# Patient Record
Sex: MALE | Race: WHITE | Employment: OTHER | URBAN - METROPOLITAN AREA
[De-identification: names, ages, dates, MRNs, and addresses within clinical notes are randomized per-mention and may not be internally consistent; named-entity substitution may affect disease eponyms.]

---

## 2022-06-07 ENCOUNTER — OFFICE VISIT (OUTPATIENT)
Dept: NEUROLOGY | Age: 72
End: 2022-06-07
Payer: MEDICARE

## 2022-06-07 VITALS
WEIGHT: 190 LBS | BODY MASS INDEX: 26.5 KG/M2 | DIASTOLIC BLOOD PRESSURE: 98 MMHG | SYSTOLIC BLOOD PRESSURE: 156 MMHG | HEART RATE: 65 BPM

## 2022-06-07 DIAGNOSIS — R26.9 GAIT DISORDER: ICD-10-CM

## 2022-06-07 DIAGNOSIS — G62.9 SENSORY NEUROPATHY: ICD-10-CM

## 2022-06-07 DIAGNOSIS — R20.0 NUMBNESS AND TINGLING OF BOTH FEET: Primary | ICD-10-CM

## 2022-06-07 DIAGNOSIS — R29.898 WEAKNESS OF BOTH LEGS: ICD-10-CM

## 2022-06-07 DIAGNOSIS — R20.2 NUMBNESS AND TINGLING OF BOTH FEET: Primary | ICD-10-CM

## 2022-06-07 PROCEDURE — 95910 NRV CNDJ TEST 7-8 STUDIES: CPT | Performed by: PSYCHIATRY & NEUROLOGY

## 2022-06-07 PROCEDURE — 95885 MUSC TST DONE W/NERV TST LIM: CPT | Performed by: PSYCHIATRY & NEUROLOGY

## 2022-06-07 NOTE — PATIENT INSTRUCTIONS
Patient Education        Learning About Peripheral Neuropathy  What is peripheral neuropathy? Peripheral neuropathy is a problem that affects the peripheral nerves. These nerves lead from the spinal cord to other parts of the body. They control yoursense of touch, how you feel pain and temperature, and your muscle strength. Most of the time the problem starts in the fingers and toes. As it gets worse, it moves into the limbs. It can cause pain and loss of feeling in the feet,legs, and hands. What causes it? There are several causes:   Diabetes. This is the most common cause. If your blood sugar is too high for too long, it can damage the nerves.  Kidney problems. These can lead to toxic substances in the blood that damage nerves.  Low levels of thyroid hormone (hypothyroidism). This may cause swelling of the tissues around the nerves, which can put pressure on them.  Infectious or inflammatory diseases. Examples are HIV and Guillain-Barré syndrome. These diseases can damage the nerves.  Peripheral nerve injury. A physical injury can damage the nerves. Injuries can be from things like falls, car crashes, or playing sports.  Overusing alcohol and not eating a healthy diet. These can lead to your body not having enough of certain vitamins, such as vitamin B-12. This can damage nerves.  Being exposed to toxic substances. These include lead, mercury, and certain medicines, such as those used for chemotherapy. Sometimes the cause isn't known. What are the symptoms? Symptoms of peripheral neuropathy can occur slowly over time. The most commonones are:   Numbness, tightness, and tingling, especially in the legs, hands, and feet.  Loss of feeling.  Burning, shooting, or stabbing pain in the legs, hands, and feet. Often the pain is worse at night.  Weakness and loss of balance. What can happen if you have it?   If peripheral neuropathy gets worse, it can lead to a complete lack of feeling in your hands or feet. This can make you more likely to injure them. It may lead to calluses and blisters. It can also lead to bone and joint problems,infection, and ulcers. For instance, small, repeated injuries to the foot may lead to bigger problems. This can happen because you can't feel the injuries. Reduced feeling in thefeet can also change your step, leading to bone or joint problems. If untreated, foot problems can become so severe that the foot or lower leg may have to be amputated. But treatment can slow down peripheral neuropathy. Andit's a good idea to take care to avoid injury. How is it diagnosed? To diagnose peripheral neuropathy, your doctor will ask you about:   Your symptoms.  Your medical history. This may include your use of alcohol, risk of HIV infection, or exposure to toxic substances.  Your family's medical history, including nerve disease. Your doctor may test how well you can feel touch, temperature, and pain. You may also have blood tests. These tests will help the doctor find out if you have conditions that can cause neuropathy. Examples are diabetes, vitamindeficiencies, thyroid disease, and kidney problems. How is it treated? Treatment for peripheral neuropathy can relieve symptoms. This is done by treating the health problem that's causing it. For example, if you have diabetes, keeping your blood sugar within your target range may help. Or maybe your body lacks certain vitamins caused by drinking too much alcohol. In that case, treatment may include eating a healthy diet, taking vitamins, andstopping alcohol use. You may have physical therapy. This can increase muscle strength and help build muscle control. Over-the-counter medicine can relieve mild nerve pain. Your doctor may also prescribe medicine to help with severe pain, numbness, tingling, and weakness. If you have neuropathy in your feet, it's a good ideato have them checked during each office visit.  This can help prevent problems. Some people find that physical therapy, acupuncture, or transcutaneouselectrical nerve stimulation (TENS) helps relieve pain. Follow-up care is a key part of your treatment and safety. Be sure to make and go to all appointments, and call your doctor if you are having problems. It's also a good idea to know your test results and keep alist of the medicines you take. Where can you learn more? Go to https://chpepiceweb.Altar. org and sign in to your [employer] account. Enter P130 in the [employer] box to learn more about \"Learning About Peripheral Neuropathy. \"     If you do not have an account, please click on the \"Sign Up Now\" link. Current as of: July 28, 2021               Content Version: 13.2  © 2006-2022 [employer]. Care instructions adapted under license by [facility] ([facility]). If you have questions about a medical condition or this instruction, always ask your healthcare professional. [place] [address] any warranty or liability for your use of this information. Patient Education        Preventing Falls: Care Instructions  Your Care Instructions     Getting around your home safely can be a challenge if you have injuries or health problems that make it easy for you to fall. Loose rugs and furniture in walkways are among the dangers for many older people who have problems walking or who have poor eyesight. People who have conditions such as arthritis,osteoporosis, or dementia also have to be careful not to fall. You can make your home safer with a few simple measures. Follow-up care is a key part of your treatment and safety. Be sure to make and go to all appointments, and call your doctor if you are having problems. It's also a good idea to know your test results and keep alist of the medicines you take. How can you care for yourself at home?   Taking care of yourself   Exercise regularly to improve your strength, muscle tone, and balance. Walk if you can. Swimming may be a good choice if you cannot walk easily.  Have your vision and hearing checked each year or any time you notice a change. If you have trouble seeing and hearing, you might not be able to avoid objects and could lose your balance.  Know the side effects of the medicines you take. Ask your doctor or pharmacist whether the medicines you take can affect your balance. Sleeping pills or sedatives can affect your balance.  Limit the amount of alcohol you drink. Alcohol can impair your balance and other senses.  Ask your doctor whether calluses or corns on your feet need to be removed. If you wear loose-fitting shoes because of calluses or corns, you can lose your balance and fall.  Talk to your doctor if you have numbness in your feet.  You may get dizzy if you do not drink enough water. To prevent dehydration, drink plenty of fluids. Choose water and other clear liquids. If you have kidney, heart, or liver disease and have to limit fluids, talk with your doctor before you increase the amount of fluids you drink. Preventing falls at home   Remove raised doorway thresholds, throw rugs, and clutter. Repair loose carpet or raised areas in the floor. [facility] furniture and electrical cords to keep them out of walking paths.  Use nonskid floor wax, and wipe up spills right away, especially on ceramic tile floors.  If you use a walker or cane, put rubber tips on it. If you use crutches, clean the bottoms of them regularly with an abrasive pad, such as steel wool.  Keep your house well lit, especially [employer], and outside walkways. Use night-lights in areas such as hallways and bathrooms. Add extra light switches or use remote switches (such as switches that go on or off when you clap your hands) to make it easier to turn lights on if you have to get up during the night.  Install sturdy handrails on stairways.    Move items in your cabinets so that the things you use a lot are on the lower shelves (about waist level).  Keep a cordless phone and a flashlight with new batteries by your bed. If possible, put a phone in each of the main rooms of your house, or carry a cell phone in case you fall and cannot reach a phone. Or, you can wear a device around your neck or wrist. You push a button that sends a signal for help.  Wear low-heeled shoes that fit well and give your feet good support. Use footwear with nonskid soles. Check the heels and soles of your shoes for wear. Repair or replace worn heels or soles.  Do not wear socks without shoes on wood floors.  Walk on the grass when the sidewalks are slippery. If you live in an area that gets snow and ice in the winter, sprinkle salt on slippery steps and sidewalks. Or ask a family member or friend to do this for you. Preventing falls in the bath   Install grab bars and nonskid mats inside and outside your shower or tub and near the toilet and sinks.  Use shower chairs and bath benches.  Use a hand-held shower head that will allow you to sit while showering.  Get into a tub or shower by putting the weaker leg in first. Get out of a tub or shower with your strong side first.   Repair loose toilet seats and consider installing a raised toilet seat to make getting on and off the toilet easier.  Keep your bathroom door unlocked while you are in the shower. Where can you learn more? Go to https://True Fitngozi.Checkmarx. org and sign in to your [employer] account. Enter 0476 [id number] [phone] in the [facility] box to learn more about \"Preventing Falls: Care Instructions. \"     If you do not have an account, please click on the \"Sign Up Now\" link. Current as of: September 8, 2021               Content Version: 13.2  © 7290-7735 Healthwise, Incorporated. Care instructions adapted under license by [facility] ([facility]).  If you have questions about a medical condition or this instruction, always ask your healthcare professional. [address] any warranty or liability for your use of this information. Patient Education        Preventing Outdoor Falls: Care Instructions  Your Care Instructions     Worries about falls don't need to keep you indoors. Outdoor activities like walking have big benefits for your health. You will need to watch your step andlearn a few safety measures. If you are worried about having a fall outdoors, ask your doctor about exercises, classes, or physical therapy that may help. You can learn ways to gain strength, flexibility, and balance. Ask if it might help to use a cane orwalker. You can make your time outdoors safer with a few simple measures. Follow-up care is a key part of your treatment and safety. Be sure to make and go to all appointments, and call your doctor if you are having problems. It's also a good idea to know your test results and keep alist of the medicines you take. How can you prevent falls outdoors?  Wear shoes with firm soles and low heels. If you have to walk on an icy surface, use grippers that can be worn over your shoes in bad weather.  Be extra careful if weather is bad. Walk on the grass when the sidewalks are slick. If you live in a place that gets snow and ice in the winter, sprinkle salt on slippery stairs and sidewalks.  Be careful getting on or off buses and trains or getting in and out of cars. If handrails are available, use them.  Be careful when you cross the street. Look for crosswalks or places where curb cuts or ramps are present.  Try not to hurry, especially if you are carrying something.  Be cautious in parking lots or garages. There may be curbs or changes in pavement, or the height of the pavement may vary.  Make sure to wear the correct eyeglasses, if you need them. Reading glasses or bifocals can make it harder to see hazards that might be in your way.    If you are walking outdoors for exercise, try to:  ? Walk in well-lighted, well-maintained areas. These include high school or college tracks, shopping malls, and public spaces. ? Walk with a partner. ? Watch out for cracked sidewalks, curbs, changes in the height of the pavement, exposed tree roots, and debris such as fallen leaves or branches. Where can you learn more? Go to https://Pindrop Securitypepiceweb.healthSgnam. org and sign in to your [employer] account. Enter U071 in the [facility] box to learn more about \"Preventing Outdoor Falls: Care Instructions. \"     If you do not have an account, please click on the \"Sign Up Now\" link. Current as of: September 8, 2021               Content Version: 13.2  © 2006-2022 Healthwise, Incorporated. Care instructions adapted under license by [facility] ([facility]). If you have questions about a medical condition or this instruction, always ask your healthcare professional. [place] [address] any warranty or liability for your use of this information.

## 2022-06-07 NOTE — PROGRESS NOTES
EMG/Nerve Conduction Study Procedure Note  [facility] [place]   [phone]      Hx:    Exam:     66 y.o. male returning today for EMG/NCV of the lower extremities for numbness and tingling bottom of feet and toes for the past year as well as weakness for the past couple of months, wife states weakness has improved. Diagnosed with Parkinson's disease 2019. No hx of DM. Dec DTR distal and PP and Vib loss stockings dist bilat symmetric. Summary               G of selected muscles of the lower extremities as noted below. 1.      Controlled environmental factors / EMG lab. Temperature. 2. NCV : sensory segments:    Abnormal = there is borderline SCV bilateral sural SCV but significant amplitude reduction SNAP. 3. NCV plantar sensory segments: Deferred. 4. NCV Motor MCV segments:     Normal bilateral peroneal bilateral tibial MCV CMAP. No definitive motor phenomena = MCV.  5. F-wave studies:   Abnormal = prolonged bilateral peroneal bilateral tibial F waves. 6. H-REFLEX Studies:    Abnormal = significantly prolonged bilateral H-reflexes. 7.  NEEDLE EMG:   Tested muscles[de-identified] TA MG VL muscles bilaterally = = slight abnormalities of the bilateral gastrocnemius with reduced interference but no definitive giant MUP and no fibrillation positive sharp waves. Otherwise normal = without PSW or fasciculations or fibrillations. No myotonia. INTERPRETATION:   THESE FINDINGS ARE ELECTROPHYSIOLOGIC EVIDENCE OF A DISTAL SENSORIMOTOR NEUROPATHY/POLYNEUROPATHY WITHOUT MYOTONIA OR MYOPATHY. NO FASCICULATIONS. MILD DEMYELINATION AND SLIGHT AXONAL PROBABLY SECONDARILY AXONAL TYPE. 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 Procedure Details:       Polyneuropathy = this correlates with his clinical evaluation and history and examination. Patient is made fully aware.   He will follow-up with  Sarita Ards. Please Note[de-identified]     Data and waveforms * filed under Procedure category [facility]. See Procedure Files for complete data pages. Addison Beck MD  Consultative Neurology, Neurodiagnostics   [facility]    [address] [place] [address], [address] [place] [address]  Phone:  [phone]  Fax:   [phone]          + + +   Glossary:   MUP: motor unit potential;  SNAP: sensory nerve action potential; Fibs:  Fibrillations; Fascic: fasciculations; IA: insertional activity;  IP: interference pattern;  SCV :  Sensory conduction velocity;  MCV: motor conduction velocity; NOTE[de-identified] muscles are abbreviated latin initials. [place] raw datafile[de-identified]   * filed at Procedure or [employer].  *

## 2022-09-27 ENCOUNTER — SCHEDULED TELEPHONE ENCOUNTER (OUTPATIENT)
Dept: NEUROLOGY | Age: 72
End: 2022-09-27
Payer: MEDICARE

## 2022-09-27 DIAGNOSIS — G47.52 REM SLEEP BEHAVIOR DISORDER: ICD-10-CM

## 2022-09-27 DIAGNOSIS — R26.9 GAIT DISTURBANCE: ICD-10-CM

## 2022-09-27 DIAGNOSIS — G31.84 MILD COGNITIVE IMPAIRMENT: ICD-10-CM

## 2022-09-27 DIAGNOSIS — G62.9 PERIPHERAL POLYNEUROPATHY: ICD-10-CM

## 2022-09-27 DIAGNOSIS — G20 MOTOR FLUCTUATIONS RELATED TO MEDICATION USE IN PARKINSON'S DISEASE (HCC): ICD-10-CM

## 2022-09-27 DIAGNOSIS — T42.8X5A MOTOR FLUCTUATIONS RELATED TO MEDICATION USE IN PARKINSON'S DISEASE (HCC): ICD-10-CM

## 2022-09-27 DIAGNOSIS — G20 PARKINSON'S DISEASE (HCC): Primary | ICD-10-CM

## 2022-09-27 PROCEDURE — 99443 PR PHYS/QHP TELEPHONE EVALUATION 21-30 MIN: CPT | Performed by: PSYCHIATRY & NEUROLOGY

## 2022-09-27 NOTE — PROGRESS NOTES
[facility] Neurology Telephone Encounter  [address], [address] [facility], [address] [place] [address]  Phone: [phone] Fax [phone]  Provider: Lilly Geiger MD    Patient: Devika Sy  Date: 9/28/2022    Telephone Encounter     Devika Sy is a 66 y.o. male who was evaluated by telephone. Patient was offered an encounter using audio-video technology either via [address] or iHigh. me but declined. Consent:  He and/or his healthcare decision maker is aware that this patient-initiated Telehealth encounter is a billable service, with coverage as determined by his insurance carrier. He is aware that he may receive a bill and has provided verbal consent to proceed: YES    I was at home while conducting this encounter. Patient Location: Patient Home    Subjective     Chief Complaint   Patient presents with    Follow-up    Neurologic Problem       Devika Sy is a 66 y.o. male who presents for follow-up of Parkinson's disease. He is accompanied by his spouse. He was last seen March 2022. Patient presents for follow-up of Parkinson's disease diagnosed approximately 2019 with symptoms at onset including gait disturbances, hypophonia, slight tremors of the right hand. He was previously followed by Dr. Naima Napier in [place]. Current medications include:  Sinemet  mg 1.5 tablet 3 times a day (7 AM, 1 PM, 6 PM)     Previous medication trials include: Gocovri (adverse effects - hallucinations)    Patient presents today for follow-up and things continue to be very stable since the last visit. He has had a very positive response to levodopa with respect to his mobility and he continues to deny any adverse effects and no dyskinesias. There have been no obvious short duration responses or early wearing off in between doses.     He has participated in physical therapy in the past and is currently participating in some regular aerobic exercise including time on a stationary bike and other training at the gym.  He tries to walk regularly. There has been some softening of his voice over time but denies any dysphagia. He has not participated in speech therapy. Continues to have constipation but is well managed with a high-fiber diet. There have been issues with worsening cognitive impairment highlighted by short-term memory lapses, slowed processing speed, and word finding difficulty although this does not seem to be causing a significant degree of day-to-day dysfunction. It has become more noticeable however. There continues to be no psychosis or visual hallucinations. He did have a bad reaction to Gocovri in the past with psychotic symptoms but none currently. There have been some issues with REM behavior disorder but these have not been very problematic. We did review recent nerve conduction studies of his lower extremities which did show findings of a distal mild axonal-demyelinating sensorimotor polyneuropathy and we discussed the significance of these findings. He does have some numbness and sensations of coldness in his feet but largely denies any neuropathic pain. Past Medical History:     Past medical history, surgical history, social history, family history, medications, and allergies were reviewed and updated as appropriate.      PAST MEDICAL HISTORY:  Past Medical History:   Diagnosis Date    Constipation     Depression     Hypertension     Parkinson's disease ([facility] [address]     Prostate cancer ([facility] [address]     RBD (REM behavioral disorder)      PAST SURGICAL HISTORY:   Past Surgical History:   Procedure Laterality Date    CORONARY ANGIOPLASTY WITH STENT PLACEMENT      HERNIA REPAIR      PACEMAKER PLACEMENT      PROSTATE SURGERY      TRANSURETHRAL RESECTION OF PROSTATE      TYMPANOPLASTY       FAMILY HISTORY:  Family History   Problem Relation Age of Onset    Parkinson's Disease Sister      SOCIAL HISTORY:  Social History     Socioeconomic History    Marital status:  Medications/Allergies:     MEDICATIONS:   Outpatient Encounter Medications as of 9/27/2022   Medication Sig Dispense Refill    aspirin 81 MG EC tablet Take by mouth daily      carbidopa-levodopa (SINEMET)  MG per tablet Take 1.5 tablets by mouth 3 times daily      losartan (COZAAR) 100 MG tablet Take 100 mg by mouth daily      metoprolol tartrate (LOPRESSOR) 50 MG tablet Take by mouth 2 times daily      pravastatin (PRAVACHOL) 40 MG tablet Take 40 mg by mouth       No facility-administered encounter medications on file as of 9/27/2022. ALLERGIES:  No Known Allergies    Lab/Imaging Review:   I REVIEWED PERTINENT LABS, IMAGES, AND REPORTS WITH THE PATIENT PERSONALLY, DIRECTLY AND FULLY. THE MOST PERTINENT FINDINGS ARE NOTED BELOW:    INTERPRETATION:   THESE FINDINGS ARE ELECTROPHYSIOLOGIC EVIDENCE OF A DISTAL SENSORIMOTOR NEUROPATHY/POLYNEUROPATHY WITHOUT MYOTONIA OR MYOPATHY. NO FASCICULATIONS. MILD DEMYELINATION AND SLIGHT AXONAL PROBABLY SECONDARILY AXONAL TYPE. 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 Assessment and Plan:     Geronimo Jacques is a 66 y.o. male who presents with the following issues:     Miranda Maagña was seen today for follow-up and neurologic problem. Diagnoses and all orders for this visit:    Parkinson's disease ([address]    Motor fluctuations related to medication use in Parkinson's disease ([address]    REM sleep behavior disorder    Mild cognitive impairment    Gait disturbance    Peripheral polyneuropathy    Patient presents to establish care for Parkinson's disease complicated by resting tremor with motor fluctuations, gait disturbance, mild cognitive impairment, REM behavior disorder. Continue Sinemet  mg 1.5 tablets 3 times a day and we have discussed the possibility of increasing the dosage further in the future if needed.     We will need to be cautious of certain add-on therapies in the future due to higher risk of adverse effects such as psychosis. Anticholinergics and dopamine agonists would be relatively risky. He has had prior intolerability to Gocovri. Repeat trials of physical therapy could be considered for his gait should symptoms worsen. He has been counseled on the benefits of aerobic exercise and currently doing very well with this. There is evidence of mild cognitive impairment/likely Parkinson's disease dementia. We will continue to monitor at this time as things are relatively stable. Trials of cholinesterase inhibitors could be considered in the future should symptoms worsen. We will monitor for any other worsening psychosis or hallucinations. REM behavior and constipation seems well managed at the current time. We discussed the findings of his nerve conduction study which are consistent with a peripheral neuropathy of the lower extremities relatively mild in severity. He denies any neuropathic pain so any other medications have been deferred at this time. Follow up in 6 months. I have personally interviewed Mr. Lavelle Pappas and I have personally reviewed all relevant records including labs and imaging as noted above. I have written all aspects of this note. More than 50% of this time was used for counseling regarding my diagnosis, prognosis, and plans for management. Total visit time: 25 minutes.       Signature: Padmini Huntley MD      Date:  9/28/2022    [address] [place] [address] Neurology   [address]  Ph: [phone]  Fax: [phone]

## 2023-03-29 ENCOUNTER — OFFICE VISIT (OUTPATIENT)
Dept: NEUROLOGY | Age: 73
End: 2023-03-29
Payer: MEDICARE

## 2023-03-29 VITALS — HEIGHT: 71 IN | WEIGHT: 173.2 LBS | BODY MASS INDEX: 24.25 KG/M2

## 2023-03-29 DIAGNOSIS — G20 PARKINSON'S DISEASE (HCC): Primary | ICD-10-CM

## 2023-03-29 DIAGNOSIS — G20 MOTOR FLUCTUATIONS RELATED TO MEDICATION USE IN PARKINSON'S DISEASE (HCC): ICD-10-CM

## 2023-03-29 DIAGNOSIS — R26.9 GAIT DISTURBANCE: ICD-10-CM

## 2023-03-29 DIAGNOSIS — G31.84 MILD COGNITIVE IMPAIRMENT: ICD-10-CM

## 2023-03-29 DIAGNOSIS — G62.9 PERIPHERAL POLYNEUROPATHY: ICD-10-CM

## 2023-03-29 DIAGNOSIS — T42.8X5A MOTOR FLUCTUATIONS RELATED TO MEDICATION USE IN PARKINSON'S DISEASE (HCC): ICD-10-CM

## 2023-03-29 DIAGNOSIS — G47.52 REM SLEEP BEHAVIOR DISORDER: ICD-10-CM

## 2023-03-29 PROCEDURE — G8427 DOCREV CUR MEDS BY ELIG CLIN: HCPCS | Performed by: PSYCHIATRY & NEUROLOGY

## 2023-03-29 PROCEDURE — 3017F COLORECTAL CA SCREEN DOC REV: CPT | Performed by: PSYCHIATRY & NEUROLOGY

## 2023-03-29 PROCEDURE — 99215 OFFICE O/P EST HI 40 MIN: CPT | Performed by: PSYCHIATRY & NEUROLOGY

## 2023-03-29 PROCEDURE — 4004F PT TOBACCO SCREEN RCVD TLK: CPT | Performed by: PSYCHIATRY & NEUROLOGY

## 2023-03-29 PROCEDURE — 1123F ACP DISCUSS/DSCN MKR DOCD: CPT | Performed by: PSYCHIATRY & NEUROLOGY

## 2023-03-29 PROCEDURE — G8420 CALC BMI NORM PARAMETERS: HCPCS | Performed by: PSYCHIATRY & NEUROLOGY

## 2023-03-29 PROCEDURE — G8484 FLU IMMUNIZE NO ADMIN: HCPCS | Performed by: PSYCHIATRY & NEUROLOGY

## 2023-03-29 NOTE — PROGRESS NOTES
day for worsening motor symptoms. There is room to increase further if needed. We will continue to monitor for any worsening motor fluctuations or levodopa induced dyskinesias. In the future we will need to be cautious of add-on therapy due to high risk of adverse effects given history of visual hallucinations in the past.  Anticholinergics and dopamine agonists would be relatively high risk. Repeat trials of physical therapy could be considered for his gait should symptoms worsen. He has been counseled on the benefits of aerobic exercise and currently doing very well with this. There is evidence of mild cognitive impairment/possible Parkinson's disease dementia. We will continue to monitor at this time as things are relatively stable. Trials of cholinesterase inhibitors could be considered in the future should symptoms worsen. We will monitor for any other worsening psychosis or hallucinations. REM behavior and constipation seems well managed at the current time. He does have evidence of a length-dependent peripheral neuropathy. They are going to forward labs from his [facility] provider for further review. Pending review, we may consider further labs for rule out of other acquired causes. Follow-up in 6 months. Signature: Sherif Cool MD      Date: 3/30/23  [facility]   [address] , [address]  Ph: [phone]  Fax: [phone]         I have personally interviewed and examined Mr. Rodríguez Dubose and I have personally reviewed all relevant records including labs and imaging as noted above. I have written all aspects of this note. More than 50% of this time was used for counseling regarding my diagnosis, prognosis, and plans for management. Total visit time: 43 minutes.

## 2023-03-30 ASSESSMENT — ENCOUNTER SYMPTOMS
VOICE CHANGE: 1
CONSTIPATION: 1
COUGH: 0

## 2023-10-03 ENCOUNTER — OFFICE VISIT (OUTPATIENT)
Dept: NEUROLOGY | Age: 73
End: 2023-10-03
Payer: MEDICARE

## 2023-10-03 VITALS
BODY MASS INDEX: 22.45 KG/M2 | OXYGEN SATURATION: 96 % | HEART RATE: 64 BPM | WEIGHT: 161 LBS | DIASTOLIC BLOOD PRESSURE: 68 MMHG | SYSTOLIC BLOOD PRESSURE: 108 MMHG

## 2023-10-03 DIAGNOSIS — R26.9 GAIT DISTURBANCE: ICD-10-CM

## 2023-10-03 DIAGNOSIS — G62.9 PERIPHERAL POLYNEUROPATHY: ICD-10-CM

## 2023-10-03 DIAGNOSIS — G20.A2 PARKINSON'S DISEASE WITHOUT DYSKINESIA, WITH FLUCTUATING MANIFESTATIONS: Primary | ICD-10-CM

## 2023-10-03 DIAGNOSIS — G47.52 REM SLEEP BEHAVIOR DISORDER: ICD-10-CM

## 2023-10-03 DIAGNOSIS — G20 MODERATE DEMENTIA DUE TO PARKINSON'S DISEASE, WITHOUT BEHAVIORAL DISTURBANCE, PSYCHOTIC DISTURBANCE, MOOD DISTURBANCE, OR ANXIETY (HCC): ICD-10-CM

## 2023-10-03 DIAGNOSIS — F02.B0 MODERATE DEMENTIA DUE TO PARKINSON'S DISEASE, WITHOUT BEHAVIORAL DISTURBANCE, PSYCHOTIC DISTURBANCE, MOOD DISTURBANCE, OR ANXIETY (HCC): ICD-10-CM

## 2023-10-03 PROCEDURE — G8420 CALC BMI NORM PARAMETERS: HCPCS | Performed by: PSYCHIATRY & NEUROLOGY

## 2023-10-03 PROCEDURE — 1123F ACP DISCUSS/DSCN MKR DOCD: CPT | Performed by: PSYCHIATRY & NEUROLOGY

## 2023-10-03 PROCEDURE — G8484 FLU IMMUNIZE NO ADMIN: HCPCS | Performed by: PSYCHIATRY & NEUROLOGY

## 2023-10-03 PROCEDURE — G8427 DOCREV CUR MEDS BY ELIG CLIN: HCPCS | Performed by: PSYCHIATRY & NEUROLOGY

## 2023-10-03 PROCEDURE — 3017F COLORECTAL CA SCREEN DOC REV: CPT | Performed by: PSYCHIATRY & NEUROLOGY

## 2023-10-03 PROCEDURE — 99215 OFFICE O/P EST HI 40 MIN: CPT | Performed by: PSYCHIATRY & NEUROLOGY

## 2023-10-03 PROCEDURE — 96132 NRPSYC TST EVAL PHYS/QHP 1ST: CPT | Performed by: PSYCHIATRY & NEUROLOGY

## 2023-10-03 PROCEDURE — 1036F TOBACCO NON-USER: CPT | Performed by: PSYCHIATRY & NEUROLOGY

## 2023-10-03 PROCEDURE — 96138 PSYCL/NRPSYC TECH 1ST: CPT | Performed by: PSYCHIATRY & NEUROLOGY

## 2023-10-03 ASSESSMENT — PATIENT HEALTH QUESTIONNAIRE - PHQ9
SUM OF ALL RESPONSES TO PHQ9 QUESTIONS 1 & 2: 0
1. LITTLE INTEREST OR PLEASURE IN DOING THINGS: 0
SUM OF ALL RESPONSES TO PHQ QUESTIONS 1-9: 0
SUM OF ALL RESPONSES TO PHQ QUESTIONS 1-9: 0
2. FEELING DOWN, DEPRESSED OR HOPELESS: 0
SUM OF ALL RESPONSES TO PHQ QUESTIONS 1-9: 0
SUM OF ALL RESPONSES TO PHQ QUESTIONS 1-9: 0

## 2023-10-03 ASSESSMENT — ENCOUNTER SYMPTOMS
COUGH: 0
VOICE CHANGE: 1
CONSTIPATION: 1

## 2023-10-03 NOTE — PATIENT INSTRUCTIONS
Reduce your carbidopa levodopa  mg to 1 tablet 3 times a day. I would like you to add on carbidopa levodopa extended release  mg 1 tablet 3 times a day. I would consider some physical therapy for her mobility. And consider some speech therapy for your voice. We can set these up in [place]. I think we could consider starting donepezil for the memory. This would be 1 tablet at night.

## 2023-10-03 NOTE — PROGRESS NOTES
MG tablet Take 1 tablet by mouth       No current facility-administered medications on file prior to visit. ALLERGIES:  No Known Allergies      Physical Exam:     Visit Vitals  Vitals:    10/03/23 1455   BP: 108/68   Pulse: 64   SpO2:        General Exam:  General: Well developed, well nourished, in no apparent distress. HEENT: Normocephalic, atraumatic. Sclera anicteric. Oropharynx clear. Neck: Supple without masses  Cardiovascular: Regular rate and rhythm. No carotid bruits. Lungs: Non-labored breathing. Abdomen: Soft, nontender, nondistended. Extremities: Peripheral pulses intact. No edema and no rashes. Neurological Exam:     MS/Language/Speech:  Alert. Oriented to person, place, and time. Further cognitive assessment as noted above. Language fluent. Speech hypophonic. Cranial Nerves: PERRL. Eye movements full with normal pursuits. No nystagmus. There is diminished facial expression though activation is symmetric. Tongue and palate were midline. Shoulder shrug sluggish bilaterally. Motor: Strength was full in all proximal and distal muscle groups. There is moderate rigidity bilaterally in the upper extremities and lower extremities without clear asymmetry. Abnormal Movements: There is an intermittent resting tremor of the right hand. There is no postural or action tremor. Rapid alternating movements show moderate slowing bilaterally slightly worse on the left. Sensory: Normal to light touch throughout. Cerebellar: No ataxia or dysmetria with finger-nose-finger bilaterally. Reflexes (R/L): Biceps (2+/2+), Brachioradialis (2+/2+), Patellar (2+/2+), Ankle (1+/1+). Treviño's was negative. Gait: He is slow to rise from a seated position. Posture is mildly stooped. He walks with a mildly slowed gait with slightly diminished stride length. There is exaggerated arm swing bilaterally. Resting tremor noted in the right hand. Turns are en bloc.   No freezing of gait is

## 2023-10-04 RX ORDER — DONEPEZIL HYDROCHLORIDE 5 MG/1
5 TABLET, FILM COATED ORAL NIGHTLY
Qty: 30 TABLET | Refills: 5 | Status: SHIPPED | OUTPATIENT
Start: 2023-10-04

## 2023-10-04 RX ORDER — CARBIDOPA AND LEVODOPA 25; 100 MG/1; MG/1
1 TABLET, EXTENDED RELEASE ORAL 3 TIMES DAILY
Qty: 90 TABLET | Refills: 5 | Status: SHIPPED | OUTPATIENT
Start: 2023-10-04

## 2024-04-08 ENCOUNTER — OFFICE VISIT (OUTPATIENT)
Dept: NEUROLOGY | Age: 74
End: 2024-04-08
Payer: MEDICARE

## 2024-04-08 VITALS
WEIGHT: 166 LBS | DIASTOLIC BLOOD PRESSURE: 95 MMHG | BODY MASS INDEX: 23.24 KG/M2 | SYSTOLIC BLOOD PRESSURE: 159 MMHG | HEART RATE: 68 BPM | HEIGHT: 71 IN

## 2024-04-08 DIAGNOSIS — R49.8 HYPOPHONIA: ICD-10-CM

## 2024-04-08 DIAGNOSIS — R26.9 GAIT DISTURBANCE: ICD-10-CM

## 2024-04-08 DIAGNOSIS — G20.A1 MODERATE DEMENTIA DUE TO PARKINSON'S DISEASE, WITHOUT BEHAVIORAL DISTURBANCE, PSYCHOTIC DISTURBANCE, MOOD DISTURBANCE, OR ANXIETY (HCC): ICD-10-CM

## 2024-04-08 DIAGNOSIS — F02.B0 MODERATE DEMENTIA DUE TO PARKINSON'S DISEASE, WITHOUT BEHAVIORAL DISTURBANCE, PSYCHOTIC DISTURBANCE, MOOD DISTURBANCE, OR ANXIETY (HCC): ICD-10-CM

## 2024-04-08 DIAGNOSIS — R41.841 COGNITIVE COMMUNICATION DEFICIT: ICD-10-CM

## 2024-04-08 DIAGNOSIS — G20.A2 PARKINSON'S DISEASE WITHOUT DYSKINESIA, WITH FLUCTUATING MANIFESTATIONS (HCC): Primary | ICD-10-CM

## 2024-04-08 PROCEDURE — 99214 OFFICE O/P EST MOD 30 MIN: CPT | Performed by: PSYCHIATRY & NEUROLOGY

## 2024-04-08 PROCEDURE — 1123F ACP DISCUSS/DSCN MKR DOCD: CPT | Performed by: PSYCHIATRY & NEUROLOGY

## 2024-04-08 PROCEDURE — 1036F TOBACCO NON-USER: CPT | Performed by: PSYCHIATRY & NEUROLOGY

## 2024-04-08 PROCEDURE — G8420 CALC BMI NORM PARAMETERS: HCPCS | Performed by: PSYCHIATRY & NEUROLOGY

## 2024-04-08 PROCEDURE — 3017F COLORECTAL CA SCREEN DOC REV: CPT | Performed by: PSYCHIATRY & NEUROLOGY

## 2024-04-08 PROCEDURE — G8427 DOCREV CUR MEDS BY ELIG CLIN: HCPCS | Performed by: PSYCHIATRY & NEUROLOGY

## 2024-04-08 RX ORDER — CARBIDOPA AND LEVODOPA 25; 100 MG/1; MG/1
1 TABLET, EXTENDED RELEASE ORAL 3 TIMES DAILY
Qty: 270 TABLET | Refills: 3 | Status: SHIPPED | OUTPATIENT
Start: 2024-04-08

## 2024-04-08 RX ORDER — DONEPEZIL HYDROCHLORIDE 10 MG/1
10 TABLET, FILM COATED ORAL NIGHTLY
Qty: 90 TABLET | Refills: 3 | Status: SHIPPED | OUTPATIENT
Start: 2024-04-08

## 2024-04-08 ASSESSMENT — ENCOUNTER SYMPTOMS
COUGH: 0
CONSTIPATION: 1
VOICE CHANGE: 1

## 2024-04-08 NOTE — PROGRESS NOTES
[facility] NEUROLOGY  [address], [address]  [place] [address]  Phone: [phone] Fax [phone]  Wes Washburn MD      Patient: David Padilla  Provider: Wes Washburn MD    CC:   Chief Complaint   Patient presents with    Follow-up    Neurologic Problem     Referring Provider:     History of Present Illness:     David Padilla is a 71 y.o. RH male who presents for follow-up of Parkinson's disease.     He is accompanied by his spouse.  He was last seen October 2023.    Patient presents for follow-up of Parkinson's disease diagnosed approximately 2019 with symptoms at onset including gait disturbances, hypophonia, slight tremors of the right hand. He was previously followed by Dr. Solis in [place].    Current medications include:  Sinemet  mg 1 tablet 3 times a day (7 AM, 1 PM, 6 PM)  Sinemet CR  mg 1 tablet 3 times a day  Donepezil 5 mg at night    Previous medication trials include: Gocovri (adverse effects - hallucinations)     Patient presents today for follow-up.  No significant changes since the last visit.    Continues to have a gait disturbance with slowed and shuffling gait.  There has been a beneficial response to levodopa and the changes made at the last visit, primarily adding Sinemet CR, has been helpful at reducing motor fluctuations throughout the day.  It also seems to have been somewhat helpful for his excessive daytime drowsiness which was ultimately attributed to the higher dose of immediate release Sinemet.  He continues to have no dyskinesias.    He has participated in physical therapy in the past but has been hesitant for any new referrals.  He does engage in aerobic exercise at home mostly on a stationary bike and other training at the gym.  He also tries to walk regularly.  There has been some increasing balance impairment that has increased his risk of falls but fortunately no recent falls.  He is continuing to walk without assistance.    Some interval worsening

## 2024-09-17 ENCOUNTER — OFFICE VISIT (OUTPATIENT)
Dept: NEUROLOGY | Age: 74
End: 2024-09-17
Payer: MEDICARE

## 2024-09-17 VITALS
HEART RATE: 82 BPM | SYSTOLIC BLOOD PRESSURE: 193 MMHG | HEIGHT: 71 IN | WEIGHT: 167 LBS | DIASTOLIC BLOOD PRESSURE: 107 MMHG | BODY MASS INDEX: 23.38 KG/M2

## 2024-09-17 DIAGNOSIS — F02.B0 MODERATE DEMENTIA DUE TO PARKINSON'S DISEASE, WITHOUT BEHAVIORAL DISTURBANCE, PSYCHOTIC DISTURBANCE, MOOD DISTURBANCE, OR ANXIETY (HCC): ICD-10-CM

## 2024-09-17 DIAGNOSIS — G20.A2 PARKINSON'S DISEASE WITHOUT DYSKINESIA, WITH FLUCTUATING MANIFESTATIONS (HCC): Primary | ICD-10-CM

## 2024-09-17 DIAGNOSIS — R26.9 GAIT DISTURBANCE: ICD-10-CM

## 2024-09-17 DIAGNOSIS — G20.A1 MODERATE DEMENTIA DUE TO PARKINSON'S DISEASE, WITHOUT BEHAVIORAL DISTURBANCE, PSYCHOTIC DISTURBANCE, MOOD DISTURBANCE, OR ANXIETY (HCC): ICD-10-CM

## 2024-09-17 DIAGNOSIS — R49.8 HYPOPHONIA: ICD-10-CM

## 2024-09-17 DIAGNOSIS — G62.9 PERIPHERAL POLYNEUROPATHY: ICD-10-CM

## 2024-09-17 PROCEDURE — G8427 DOCREV CUR MEDS BY ELIG CLIN: HCPCS | Performed by: PSYCHIATRY & NEUROLOGY

## 2024-09-17 PROCEDURE — G8420 CALC BMI NORM PARAMETERS: HCPCS | Performed by: PSYCHIATRY & NEUROLOGY

## 2024-09-17 PROCEDURE — 3017F COLORECTAL CA SCREEN DOC REV: CPT | Performed by: PSYCHIATRY & NEUROLOGY

## 2024-09-17 PROCEDURE — 1036F TOBACCO NON-USER: CPT | Performed by: PSYCHIATRY & NEUROLOGY

## 2024-09-17 PROCEDURE — 1123F ACP DISCUSS/DSCN MKR DOCD: CPT | Performed by: PSYCHIATRY & NEUROLOGY

## 2024-09-17 PROCEDURE — 99214 OFFICE O/P EST MOD 30 MIN: CPT | Performed by: PSYCHIATRY & NEUROLOGY

## 2024-09-17 RX ORDER — CARBIDOPA AND LEVODOPA 50; 200 MG/1; MG/1
TABLET, EXTENDED RELEASE ORAL
Qty: 90 TABLET | Refills: 5 | Status: SHIPPED | OUTPATIENT
Start: 2024-09-17

## 2024-09-17 ASSESSMENT — ENCOUNTER SYMPTOMS
COUGH: 0
VOICE CHANGE: 1
CONSTIPATION: 1

## 2025-01-22 ENCOUNTER — OFFICE VISIT (OUTPATIENT)
Dept: NEUROLOGY | Age: 75
End: 2025-01-22
Payer: MEDICARE

## 2025-01-22 VITALS
HEART RATE: 84 BPM | HEIGHT: 71 IN | BODY MASS INDEX: 24.08 KG/M2 | SYSTOLIC BLOOD PRESSURE: 178 MMHG | WEIGHT: 172 LBS | DIASTOLIC BLOOD PRESSURE: 82 MMHG

## 2025-01-22 DIAGNOSIS — F02.B0 MODERATE DEMENTIA DUE TO PARKINSON'S DISEASE, WITHOUT BEHAVIORAL DISTURBANCE, PSYCHOTIC DISTURBANCE, MOOD DISTURBANCE, OR ANXIETY (HCC): ICD-10-CM

## 2025-01-22 DIAGNOSIS — G20.A2 PARKINSON'S DISEASE WITHOUT DYSKINESIA, WITH FLUCTUATING MANIFESTATIONS (HCC): Primary | ICD-10-CM

## 2025-01-22 DIAGNOSIS — G20.A1 MODERATE DEMENTIA DUE TO PARKINSON'S DISEASE, WITHOUT BEHAVIORAL DISTURBANCE, PSYCHOTIC DISTURBANCE, MOOD DISTURBANCE, OR ANXIETY (HCC): ICD-10-CM

## 2025-01-22 DIAGNOSIS — G62.9 PERIPHERAL POLYNEUROPATHY: ICD-10-CM

## 2025-01-22 DIAGNOSIS — R49.8 HYPOPHONIA: ICD-10-CM

## 2025-01-22 DIAGNOSIS — R26.9 GAIT DISTURBANCE: ICD-10-CM

## 2025-01-22 PROCEDURE — G8420 CALC BMI NORM PARAMETERS: HCPCS | Performed by: PSYCHIATRY & NEUROLOGY

## 2025-01-22 PROCEDURE — 1036F TOBACCO NON-USER: CPT | Performed by: PSYCHIATRY & NEUROLOGY

## 2025-01-22 PROCEDURE — 99214 OFFICE O/P EST MOD 30 MIN: CPT | Performed by: PSYCHIATRY & NEUROLOGY

## 2025-01-22 PROCEDURE — G8428 CUR MEDS NOT DOCUMENT: HCPCS | Performed by: PSYCHIATRY & NEUROLOGY

## 2025-01-22 PROCEDURE — 1123F ACP DISCUSS/DSCN MKR DOCD: CPT | Performed by: PSYCHIATRY & NEUROLOGY

## 2025-01-22 RX ORDER — CARBIDOPA AND LEVODOPA 50; 200 MG/1; MG/1
TABLET, EXTENDED RELEASE ORAL
Qty: 270 TABLET | Refills: 3 | Status: SHIPPED | OUTPATIENT
Start: 2025-01-22

## 2025-01-22 RX ORDER — DONEPEZIL HYDROCHLORIDE 10 MG/1
10 TABLET, FILM COATED ORAL NIGHTLY
Qty: 90 TABLET | Refills: 3 | Status: SHIPPED | OUTPATIENT
Start: 2025-01-22

## 2025-01-22 RX ORDER — CARBIDOPA AND LEVODOPA 25; 100 MG/1; MG/1
1 TABLET ORAL 3 TIMES DAILY
Qty: 270 TABLET | Refills: 3 | Status: SHIPPED | OUTPATIENT
Start: 2025-01-22

## 2025-01-22 ASSESSMENT — ENCOUNTER SYMPTOMS
CONSTIPATION: 1
COUGH: 0
VOICE CHANGE: 1

## 2025-01-22 NOTE — PROGRESS NOTES
carbidopa-levodopa (SINEMET CR)  MG per extended release tablet; Take 1 tablet 3 times a day in combination with CD/LD  mg.  -     carbidopa-levodopa (SINEMET)  MG per tablet; Take 1 tablet by mouth 3 times daily  -     donepezil (ARICEPT) 10 MG tablet; Take 1 tablet by mouth nightly    Moderate dementia due to Parkinson's disease, without behavioral disturbance, psychotic disturbance, mood disturbance, or anxiety ([facility])  -     donepezil (ARICEPT) 10 MG tablet; Take 1 tablet by mouth nightly    Gait disturbance    Hypophonia    Peripheral polyneuropathy      Patient presents for follow up of Parkinson's disease complicated by tremor, motor fluctuations, gait disturbance, PD dementia, hypophonia, and peripheral neuropathy.        Parkinson's disease:   Motor deficits stable.  He will continue Sinemet CR  mg tablets 1 tablet 3 times a day in combination with Sinemet  mg 1 tablet 3 times a day.  Other add-on therapy could be considered in the future such as amantadine, Nourianz, or Ongentys (though note prior adverse effects with Gocovri in the past).     Parkinson's disease dementia:  Stable. Continue donepezil to 10 mg at night.    Gait disturbance:  Stable.  He was counseled on the benefits of aerobic exercise with respect to maintaining functional mobility and delaying disease progression.    Hypophonia:  Stable.  Consider repeat referral to outpatient speech therapy should symptoms worsen.    Peripheral neuropathy:   Stable.  Continue to monitor for worsening neuropathic pain.        Follow-up in 4 months.      Signature: Wes Washburn MD      Date: 1/22/25  [address], [place] [address]  Ph: [phone]  Fax: [phone]         I have personally interviewed and examined Mr. David Padilla and I have personally reviewed all relevant records including labs and imaging as noted above. I have written all aspects of this note. More than 50%

## 2025-05-30 ENCOUNTER — OFFICE VISIT (OUTPATIENT)
Dept: NEUROLOGY | Age: 75
End: 2025-05-30
Payer: MEDICARE

## 2025-05-30 VITALS
BODY MASS INDEX: 24.78 KG/M2 | HEART RATE: 70 BPM | SYSTOLIC BLOOD PRESSURE: 176 MMHG | WEIGHT: 177 LBS | HEIGHT: 71 IN | OXYGEN SATURATION: 96 % | DIASTOLIC BLOOD PRESSURE: 90 MMHG

## 2025-05-30 DIAGNOSIS — R26.9 GAIT DISTURBANCE: ICD-10-CM

## 2025-05-30 DIAGNOSIS — R49.8 HYPOPHONIA: ICD-10-CM

## 2025-05-30 DIAGNOSIS — G20.A1 MODERATE DEMENTIA DUE TO PARKINSON'S DISEASE, WITHOUT BEHAVIORAL DISTURBANCE, PSYCHOTIC DISTURBANCE, MOOD DISTURBANCE, OR ANXIETY (HCC): ICD-10-CM

## 2025-05-30 DIAGNOSIS — F02.B0 MODERATE DEMENTIA DUE TO PARKINSON'S DISEASE, WITHOUT BEHAVIORAL DISTURBANCE, PSYCHOTIC DISTURBANCE, MOOD DISTURBANCE, OR ANXIETY (HCC): ICD-10-CM

## 2025-05-30 DIAGNOSIS — G62.9 PERIPHERAL POLYNEUROPATHY: ICD-10-CM

## 2025-05-30 DIAGNOSIS — G20.A2 PARKINSON'S DISEASE WITHOUT DYSKINESIA, WITH FLUCTUATING MANIFESTATIONS (HCC): Primary | ICD-10-CM

## 2025-05-30 PROCEDURE — G8420 CALC BMI NORM PARAMETERS: HCPCS | Performed by: PSYCHIATRY & NEUROLOGY

## 2025-05-30 PROCEDURE — 1123F ACP DISCUSS/DSCN MKR DOCD: CPT | Performed by: PSYCHIATRY & NEUROLOGY

## 2025-05-30 PROCEDURE — G8427 DOCREV CUR MEDS BY ELIG CLIN: HCPCS | Performed by: PSYCHIATRY & NEUROLOGY

## 2025-05-30 PROCEDURE — 1036F TOBACCO NON-USER: CPT | Performed by: PSYCHIATRY & NEUROLOGY

## 2025-05-30 PROCEDURE — 1159F MED LIST DOCD IN RCRD: CPT | Performed by: PSYCHIATRY & NEUROLOGY

## 2025-05-30 PROCEDURE — 99214 OFFICE O/P EST MOD 30 MIN: CPT | Performed by: PSYCHIATRY & NEUROLOGY

## 2025-05-30 ASSESSMENT — ENCOUNTER SYMPTOMS
COUGH: 0
CONSTIPATION: 1
VOICE CHANGE: 1

## 2025-05-30 NOTE — PATIENT INSTRUCTIONS
Start Nourianz:  Take 20 mg tablets, 1 tablet in the morning, for 2 weeks.  Then increase to 40 mg tablets, 1 tablet in the morning.    You may call and let me know how you are doing on the medication.  We can send a prescription and get it authorized if needed.    Try melatonin at night, available over-the-counter, would recommend 3 to 5 mg to start.